# Patient Record
Sex: MALE | Race: WHITE | Employment: UNEMPLOYED | URBAN - METROPOLITAN AREA
[De-identification: names, ages, dates, MRNs, and addresses within clinical notes are randomized per-mention and may not be internally consistent; named-entity substitution may affect disease eponyms.]

---

## 2018-07-28 ENCOUNTER — HOSPITAL ENCOUNTER (INPATIENT)
Facility: HOSPITAL | Age: 58
LOS: 3 days | Discharge: HOME | DRG: 641 | End: 2018-07-31
Attending: EMERGENCY MEDICINE | Admitting: INTERNAL MEDICINE
Payer: MEDICARE

## 2018-07-28 DIAGNOSIS — R55 SYNCOPE, NEAR: Primary | ICD-10-CM

## 2018-07-28 DIAGNOSIS — E87.1 HYPONATREMIA: ICD-10-CM

## 2018-07-28 LAB
ALBUMIN SERPL BCP-MCNC: 4.4 G/DL (ref 3.5–4.8)
ALP SERPL-CCNC: 79 U/L (ref 32–100)
ALT SERPL-CCNC: 18 U/L (ref 17–63)
ANION GAP SERPL CALC-SCNC: 7 MMOL/L (ref 0–18)
ANION GAP SERPL CALC-SCNC: 9 MMOL/L (ref 0–18)
AST SERPL-CCNC: 17 U/L (ref 15–41)
BASOPHILS # BLD: 0 K/UL (ref 0–0.2)
BASOPHILS NFR BLD: 1 %
BILIRUB DIRECT SERPL-MCNC: 0.1 MG/DL (ref 0–0.2)
BILIRUB SERPL-MCNC: 0.7 MG/DL (ref 0.3–1.2)
BILIRUB UR QL: NEGATIVE
BUN SERPL-MCNC: 6 MG/DL (ref 8–20)
BUN SERPL-MCNC: 7 MG/DL (ref 8–20)
BUN/CREAT SERPL: 9.4 (ref 10–20)
BUN/CREAT SERPL: 9.5 (ref 10–20)
CALCIUM SERPL-MCNC: 9.2 MG/DL (ref 8.5–10.5)
CALCIUM SERPL-MCNC: 9.5 MG/DL (ref 8.5–10.5)
CHLORIDE SERPL-SCNC: 88 MMOL/L (ref 95–110)
CHLORIDE SERPL-SCNC: 95 MMOL/L (ref 95–110)
CLARITY UR: CLEAR
CO2 SERPL-SCNC: 26 MMOL/L (ref 22–32)
CO2 SERPL-SCNC: 28 MMOL/L (ref 22–32)
COLOR UR: COLORLESS
CREAT SERPL-MCNC: 0.64 MG/DL (ref 0.5–1.5)
CREAT SERPL-MCNC: 0.74 MG/DL (ref 0.5–1.5)
CREAT UR-MCNC: 15.3 MG/DL
EOSINOPHIL # BLD: 0.1 K/UL (ref 0–0.7)
EOSINOPHIL NFR BLD: 1 %
ERYTHROCYTE [DISTWIDTH] IN BLOOD BY AUTOMATED COUNT: 13.9 % (ref 11–15)
GLUCOSE BLDC GLUCOMTR-MCNC: 110 MG/DL (ref 70–99)
GLUCOSE BLDC GLUCOMTR-MCNC: 123 MG/DL (ref 70–99)
GLUCOSE BLDC GLUCOMTR-MCNC: 162 MG/DL (ref 70–99)
GLUCOSE SERPL-MCNC: 112 MG/DL (ref 70–99)
GLUCOSE SERPL-MCNC: 140 MG/DL (ref 70–99)
GLUCOSE UR-MCNC: NEGATIVE MG/DL
HBA1C MFR BLD: 5.7 % (ref 4–6)
HCT VFR BLD AUTO: 43.8 % (ref 41–52)
HGB BLD-MCNC: 15.4 G/DL (ref 13.5–17.5)
HGB UR QL STRIP.AUTO: NEGATIVE
KETONES UR-MCNC: NEGATIVE MG/DL
LEUKOCYTE ESTERASE UR QL STRIP.AUTO: NEGATIVE
LYMPHOCYTES # BLD: 1.1 K/UL (ref 1–4)
LYMPHOCYTES NFR BLD: 16 %
MCH RBC QN AUTO: 30.9 PG (ref 27–32)
MCHC RBC AUTO-ENTMCNC: 35.2 G/DL (ref 32–37)
MCV RBC AUTO: 87.8 FL (ref 80–100)
MONOCYTES # BLD: 0.5 K/UL (ref 0–1)
MONOCYTES NFR BLD: 8 %
NEUTROPHILS # BLD AUTO: 5.1 K/UL (ref 1.8–7.7)
NEUTROPHILS NFR BLD: 75 %
NITRITE UR QL STRIP.AUTO: NEGATIVE
OSMOLALITY UR CALC.SUM OF ELEC: 256 MOSM/KG (ref 275–295)
OSMOLALITY UR CALC.SUM OF ELEC: 268 MOSM/KG (ref 275–295)
OSMOLALITY UR: 137 MOSM/KG (ref 300–1100)
PH UR: 7 [PH] (ref 5–8)
PLATELET # BLD AUTO: 247 K/UL (ref 140–400)
PMV BLD AUTO: 7 FL (ref 7.4–10.3)
POTASSIUM SERPL-SCNC: 3.8 MMOL/L (ref 3.3–5.1)
POTASSIUM SERPL-SCNC: 4.4 MMOL/L (ref 3.3–5.1)
PROT SERPL-MCNC: 6.3 G/DL (ref 5.9–8.4)
PROT UR-MCNC: NEGATIVE MG/DL
RBC # BLD AUTO: 4.99 M/UL (ref 4.5–5.9)
SODIUM SERPL-SCNC: 123 MMOL/L (ref 136–144)
SODIUM SERPL-SCNC: 130 MMOL/L (ref 136–144)
SODIUM UR-SCNC: 38 MMOL/L
SP GR UR STRIP: 1 (ref 1–1.03)
UROBILINOGEN UR STRIP-ACNC: <2
VIT C UR-MCNC: NEGATIVE MG/DL
WBC # BLD AUTO: 6.7 K/UL (ref 4–11)

## 2018-07-28 PROCEDURE — 99223 1ST HOSP IP/OBS HIGH 75: CPT | Performed by: INTERNAL MEDICINE

## 2018-07-28 RX ORDER — AMITRIPTYLINE HYDROCHLORIDE 10 MG/1
10 TABLET, FILM COATED ORAL NIGHTLY
COMMUNITY

## 2018-07-28 RX ORDER — GABAPENTIN 400 MG/1
800 CAPSULE ORAL 3 TIMES DAILY
Status: DISCONTINUED | OUTPATIENT
Start: 2018-07-28 | End: 2018-07-29

## 2018-07-28 RX ORDER — MORPHINE SULFATE 30 MG/1
30 TABLET ORAL EVERY 12 HOURS PRN
COMMUNITY

## 2018-07-28 RX ORDER — TRAZODONE HYDROCHLORIDE 100 MG/1
100 TABLET ORAL NIGHTLY
Status: DISCONTINUED | OUTPATIENT
Start: 2018-07-28 | End: 2018-07-31

## 2018-07-28 RX ORDER — TRAZODONE HYDROCHLORIDE 100 MG/1
200 TABLET ORAL NIGHTLY
Status: DISCONTINUED | OUTPATIENT
Start: 2018-07-28 | End: 2018-07-28

## 2018-07-28 RX ORDER — HYDROCODONE BITARTRATE AND ACETAMINOPHEN 10; 325 MG/1; MG/1
1 TABLET ORAL EVERY 6 HOURS PRN
COMMUNITY

## 2018-07-28 RX ORDER — ASPIRIN 81 MG/1
81 TABLET ORAL DAILY
Status: DISCONTINUED | OUTPATIENT
Start: 2018-07-28 | End: 2018-07-31

## 2018-07-28 RX ORDER — HYDROCODONE BITARTRATE AND ACETAMINOPHEN 5; 325 MG/1; MG/1
1 TABLET ORAL ONCE
Status: COMPLETED | OUTPATIENT
Start: 2018-07-28 | End: 2018-07-28

## 2018-07-28 RX ORDER — PRAVASTATIN SODIUM 10 MG
10 TABLET ORAL DAILY
COMMUNITY

## 2018-07-28 RX ORDER — AMITRIPTYLINE HYDROCHLORIDE 10 MG/1
10 TABLET, FILM COATED ORAL NIGHTLY
Status: DISCONTINUED | OUTPATIENT
Start: 2018-07-28 | End: 2018-07-28

## 2018-07-28 RX ORDER — ASPIRIN 81 MG/1
81 TABLET ORAL DAILY
COMMUNITY

## 2018-07-28 RX ORDER — TRAZODONE HYDROCHLORIDE 100 MG/1
200 TABLET ORAL NIGHTLY
Status: ON HOLD | COMMUNITY
End: 2018-07-31

## 2018-07-28 RX ORDER — LISINOPRIL 20 MG/1
20 TABLET ORAL DAILY
COMMUNITY

## 2018-07-28 RX ORDER — HEPARIN SODIUM 5000 [USP'U]/ML
5000 INJECTION, SOLUTION INTRAVENOUS; SUBCUTANEOUS EVERY 12 HOURS SCHEDULED
Status: DISCONTINUED | OUTPATIENT
Start: 2018-07-28 | End: 2018-07-31

## 2018-07-28 RX ORDER — MORPHINE SULFATE 15 MG/1
30 TABLET ORAL EVERY 12 HOURS PRN
Status: DISCONTINUED | OUTPATIENT
Start: 2018-07-28 | End: 2018-07-31

## 2018-07-28 RX ORDER — PRAVASTATIN SODIUM 10 MG
10 TABLET ORAL DAILY
Status: DISCONTINUED | OUTPATIENT
Start: 2018-07-28 | End: 2018-07-31

## 2018-07-28 RX ORDER — BUPROPION HYDROCHLORIDE 100 MG/1
100 TABLET ORAL 2 TIMES DAILY
COMMUNITY

## 2018-07-28 RX ORDER — LISINOPRIL 20 MG/1
20 TABLET ORAL DAILY
Status: DISCONTINUED | OUTPATIENT
Start: 2018-07-29 | End: 2018-07-31

## 2018-07-28 RX ORDER — AMLODIPINE BESYLATE 5 MG/1
5 TABLET ORAL DAILY
Status: DISCONTINUED | OUTPATIENT
Start: 2018-07-28 | End: 2018-07-31

## 2018-07-28 RX ORDER — OXCARBAZEPINE 300 MG/1
1200 TABLET, FILM COATED ORAL 3 TIMES DAILY
Status: DISCONTINUED | OUTPATIENT
Start: 2018-07-28 | End: 2018-07-29

## 2018-07-28 RX ORDER — ACETAMINOPHEN 325 MG/1
650 TABLET ORAL EVERY 6 HOURS PRN
Status: DISCONTINUED | OUTPATIENT
Start: 2018-07-28 | End: 2018-07-31

## 2018-07-28 RX ORDER — AMLODIPINE BESYLATE 5 MG/1
5 TABLET ORAL DAILY
COMMUNITY

## 2018-07-28 RX ORDER — BUPROPION HYDROCHLORIDE 100 MG/1
100 TABLET, EXTENDED RELEASE ORAL 2 TIMES DAILY
Status: DISCONTINUED | OUTPATIENT
Start: 2018-07-31 | End: 2018-07-31

## 2018-07-28 RX ORDER — CLONAZEPAM 0.5 MG/1
0.5 TABLET ORAL 2 TIMES DAILY
COMMUNITY

## 2018-07-28 RX ORDER — CLONAZEPAM 0.5 MG/1
0.5 TABLET ORAL 2 TIMES DAILY
Status: DISCONTINUED | OUTPATIENT
Start: 2018-07-28 | End: 2018-07-31

## 2018-07-28 RX ORDER — GABAPENTIN 800 MG/1
800 TABLET ORAL 3 TIMES DAILY
COMMUNITY

## 2018-07-28 RX ORDER — OXCARBAZEPINE 600 MG/1
1200 TABLET, FILM COATED ORAL 3 TIMES DAILY
Status: ON HOLD | COMMUNITY
End: 2018-07-31

## 2018-07-28 NOTE — CONSULTS
[facility]    PATIENT'S NAME: Brendan Lopez   ATTENDING PHYSICIAN: Gil Benoit MD   CONSULTING PHYSICIAN: Israel Toro MD   PATIENT ACCOUNT#:   [id number]    LOCATION:  [address] [place] [address] #:   [id number]       DATE OF BIRTH: a seizure disorder. He has a history of trigeminal neuralgia which has not responded to conservative treatment. He had some type of brain surgery to help relieve this discomfort which was not successful. As a result, he is on chronic narcotics.   He also Distal pulses 2+. NEUROLOGIC:  Mental status intact. Alert and oriented x3. Neurologic exam otherwise nonfocal.    LABORATORY DATA:  Sodium 123, a blood sugar of 140 with a glycohemoglobin of 5.7.   His BUN and creatinine were normal at 7 and 0.74 respec

## 2018-07-28 NOTE — H&P
[facility]    History and Physical    Lydia Black Patient Status:  Inpatient    1960 MRN [id number]   Location [facility] 3W/SW Attending Susanne Bernal MD   [facility] Day # 0 PCP PHYSICIAN NONSTAFF     Date:  2018  Date Besylate 5 MG Oral Tab Take 5 mg by mouth daily. Pravastatin Sodium 10 MG Oral Tab Take 10 mg by mouth daily. OXcarbazepine 600 MG Oral Tab Take 1,200 mg by mouth 3 (three) times daily.    gabapentin 800 MG Oral Tab Take 800 mg by mouth 3 (three) times --------------------------       Assessment/Plan:     Near syncope , admit to tele    Hyponatremia, fluid restriction per renal, BMP  HTN BP stable  HLD on statin  Depression on meds  Seizure disorder on meds, neuro eval  D/W wife at bedside      **Certifi

## 2018-07-28 NOTE — ED NOTES
Care assumed from EMS presents from local hotel with witnessed sz On EMS arrival hypotensive/diaphoretic Presents to ED alert and interactive with C/O Baseline R sided chronic pain LEIGH Neuro intact No oral trauma + urinary incontinence Wife at bedside Last

## 2018-07-28 NOTE — ED PROVIDER NOTES
Patient Seen in: [facility] AND [facility] Emergency Department    History   Patient presents with:  Seizure Disorder (neurologic)    Stated Complaint: Witnessed Seizure    HPI    Patient is a 48-year-old male who was brought in after possible seizure.   Patient Vitals [07/28/18 0842]  BP: 155/75  Pulse: 71  Resp: 16  Temp: n/a  Temp src: Oral  SpO2: 97 %  O2 Device: None (Room air)    Current:BP (!) 175/87   Pulse 69   Resp 14   Ht 172.7 cm (5' 8\")   Wt 101.6 kg   SpO2 98%   BMI 34.06 kg/m²         Physical Exam Narrative: The following orders were created for panel order CBC WITH DIFFERENTIAL WITH PLATELET.   Procedure                               Abnormality         Status                     ---------                               -----------         ---

## 2018-07-28 NOTE — PLAN OF CARE
PT. ADMITTED FROM ER TO CV WITH NEAR SYNCOPE, SEIZURE, HYPONATREMIA, NEPHROLOGY CONSULT TODAY, FLUID RESTRICTION, NEUROLOGY CONSULT ORDERED

## 2018-07-29 LAB
ALBUMIN SERPL BCP-MCNC: 4.2 G/DL (ref 3.5–4.8)
ANION GAP SERPL CALC-SCNC: 8 MMOL/L (ref 0–18)
BASOPHILS # BLD: 0 K/UL (ref 0–0.2)
BASOPHILS NFR BLD: 1 %
BUN SERPL-MCNC: 8 MG/DL (ref 8–20)
BUN/CREAT SERPL: 11 (ref 10–20)
CALCIUM SERPL-MCNC: 9.3 MG/DL (ref 8.5–10.5)
CHLORIDE SERPL-SCNC: 96 MMOL/L (ref 95–110)
CO2 SERPL-SCNC: 26 MMOL/L (ref 22–32)
CORTIS SERPL-MCNC: 15.9 MCG/DL
CORTIS SERPL-MCNC: 28.6 MCG/DL
CREAT SERPL-MCNC: 0.73 MG/DL (ref 0.5–1.5)
EOSINOPHIL # BLD: 0.1 K/UL (ref 0–0.7)
EOSINOPHIL NFR BLD: 1 %
ERYTHROCYTE [DISTWIDTH] IN BLOOD BY AUTOMATED COUNT: 14.1 % (ref 11–15)
GLUCOSE BLDC GLUCOMTR-MCNC: 112 MG/DL (ref 70–99)
GLUCOSE BLDC GLUCOMTR-MCNC: 115 MG/DL (ref 70–99)
GLUCOSE BLDC GLUCOMTR-MCNC: 119 MG/DL (ref 70–99)
GLUCOSE BLDC GLUCOMTR-MCNC: 91 MG/DL (ref 70–99)
GLUCOSE SERPL-MCNC: 114 MG/DL (ref 70–99)
HCT VFR BLD AUTO: 45.3 % (ref 41–52)
HGB BLD-MCNC: 15.3 G/DL (ref 13.5–17.5)
LYMPHOCYTES # BLD: 1.5 K/UL (ref 1–4)
LYMPHOCYTES NFR BLD: 23 %
MAGNESIUM SERPL-MCNC: 1.9 MG/DL (ref 1.8–2.5)
MCH RBC QN AUTO: 30.7 PG (ref 27–32)
MCHC RBC AUTO-ENTMCNC: 33.9 G/DL (ref 32–37)
MCV RBC AUTO: 90.6 FL (ref 80–100)
MONOCYTES # BLD: 0.6 K/UL (ref 0–1)
MONOCYTES NFR BLD: 9 %
NEUTROPHILS # BLD AUTO: 4.3 K/UL (ref 1.8–7.7)
NEUTROPHILS NFR BLD: 66 %
OSMOLALITY UR CALC.SUM OF ELEC: 269 MOSM/KG (ref 275–295)
PHOSPHATE SERPL-MCNC: 3.9 MG/DL (ref 2.4–4.7)
PLATELET # BLD AUTO: 221 K/UL (ref 140–400)
PMV BLD AUTO: 6.9 FL (ref 7.4–10.3)
POTASSIUM SERPL-SCNC: 4.2 MMOL/L (ref 3.3–5.1)
RBC # BLD AUTO: 5 M/UL (ref 4.5–5.9)
SODIUM SERPL-SCNC: 130 MMOL/L (ref 136–144)
TSH SERPL-ACNC: 0.52 UIU/ML (ref 0.45–5.33)
WBC # BLD AUTO: 6.6 K/UL (ref 4–11)

## 2018-07-29 PROCEDURE — 99232 SBSQ HOSP IP/OBS MODERATE 35: CPT | Performed by: INTERNAL MEDICINE

## 2018-07-29 RX ORDER — GABAPENTIN 400 MG/1
800 CAPSULE ORAL
Status: DISCONTINUED | OUTPATIENT
Start: 2018-07-29 | End: 2018-07-31

## 2018-07-29 RX ORDER — OXCARBAZEPINE 300 MG/1
1200 TABLET, FILM COATED ORAL
Status: DISCONTINUED | OUTPATIENT
Start: 2018-07-29 | End: 2018-07-31

## 2018-07-29 NOTE — PROGRESS NOTES
07/29/18 0826   Clinical Encounter Type   Visited With Patient and family together   Routine Visit Introduction   Continue Visiting Yes   Referral From Other (Comment)   Referral To    Buddhism Encounters   Buddhism Needs Prayer   Spiritual Re

## 2018-07-29 NOTE — CONSULTS
[facility]    PATIENT'S NAME: Titus Yee   ATTENDING PHYSICIAN: Enriqueta Miranda MD   CONSULTING PHYSICIAN: Tarry Leyden.  Mayito Mcclendon MD   PATIENT ACCOUNT#:   [id number]    LOCATION:  [address] [place] [address] [address] #:   [id number]       DATE OF BIRTH:  [birth year] bruits, or goiters. LUNGS:  Clear. HEART:  Regular rate and rhythm. ABDOMEN:  Benign. EXTREMITIES:  Warm and dry without cyanosis, clubbing, edema, rashes, sores, joint pain, or swelling.     IMPRESSION:  I believe that the seizure-like episode was like

## 2018-07-29 NOTE — CONSULTS
07/28/18 2113 07/29/18 0522 07/29/18  0526 07/29/18 0818   BP: 143/77 154/86  144/82   Pulse: 83 79  74   Resp: 18 22 18   Temp: 98.5 °F (36.9 °C) 98.5 °F (36.9 °C)  98.8 °F (37.1 °C)   TempSrc: Oral Oral  Oral   SpO2: 94%   95%   Weight:   222 lb 6.4

## 2018-07-29 NOTE — PLAN OF CARE
Problem: Patient Centered Care  Goal: Patient preferences are identified and integrated in the patient's plan of care  Interventions:  - What would you like us to know as we care for you?  Pt is in town from [place], staying at a hotel with wife    - Tyrone Mcculloughradha Outcome: Progressing      Problem: METABOLIC/FLUID AND ELECTROLYTES - ADULT  Goal: Electrolytes maintained within normal limits  INTERVENTIONS:  - Monitor labs and rhythm and assess patient for signs and symptoms of electrolyte imbalances  - Administer e

## 2018-07-29 NOTE — PROGRESS NOTES
[facility]    Progress Note    Sanya Cruz Patient Status:  Inpatient    1960 MRN [id number]   Location [facility] 3W/SW Attending Marcos Lomeli MD   Hosp Day # 1 PCP PHYSICIAN NONSTAFF     Subjective:     Iamtio NORMAL LIMITS No previous ECGs available Electronically signed on 07/28/2018 at 17:29 by Fabio WHITE [facility] MD, Angela Woods MD  7/29/2018

## 2018-07-29 NOTE — PROGRESS NOTES
[facility]  Nephrology Daily Progress Note    Alexis Trevino  [id number]  60year old      HPI:   Alexis Trevino is a 60year old male. Feels well. No further neurologic symptoms.   Ambulating in room      ROS:     Constitutional:  Negative fo age    Labs:    Lab Results  Component Value Date   WBC 6.6 07/29/2018   HGB 15.3 07/29/2018   HCT 45.3 07/29/2018    07/29/2018   CREATSERUM 0.73 07/29/2018   BUN 8 07/29/2018    07/29/2018   K 4.2 07/29/2018   CL 96 07/29/2018   CO2 26 07/29 Nightly    Allergies:    Prednisone              OTHER (SEE COMMENTS)    Comment:agitation/confusion  Penicillins             RASH  Sulfa Antibiotics       RASH    Input/Output:    Intake/Output Summary (Last 24 hours) at 07/29/18 1224  Last data filed at

## 2018-07-30 LAB
ALBUMIN SERPL BCP-MCNC: 4.5 G/DL (ref 3.5–4.8)
ANION GAP SERPL CALC-SCNC: 8 MMOL/L (ref 0–18)
BUN SERPL-MCNC: 8 MG/DL (ref 8–20)
BUN/CREAT SERPL: 11.3 (ref 10–20)
CALCIUM SERPL-MCNC: 9.6 MG/DL (ref 8.5–10.5)
CHLORIDE SERPL-SCNC: 90 MMOL/L (ref 95–110)
CO2 SERPL-SCNC: 30 MMOL/L (ref 22–32)
CREAT SERPL-MCNC: 0.71 MG/DL (ref 0.5–1.5)
GLUCOSE BLDC GLUCOMTR-MCNC: 109 MG/DL (ref 70–99)
GLUCOSE BLDC GLUCOMTR-MCNC: 123 MG/DL (ref 70–99)
GLUCOSE BLDC GLUCOMTR-MCNC: 84 MG/DL (ref 70–99)
GLUCOSE SERPL-MCNC: 103 MG/DL (ref 70–99)
OSMOLALITY UR CALC.SUM OF ELEC: 265 MOSM/KG (ref 275–295)
PHOSPHATE SERPL-MCNC: 3.8 MG/DL (ref 2.4–4.7)
POTASSIUM SERPL-SCNC: 4.7 MMOL/L (ref 3.3–5.1)
SODIUM SERPL-SCNC: 128 MMOL/L (ref 136–144)

## 2018-07-30 PROCEDURE — 99233 SBSQ HOSP IP/OBS HIGH 50: CPT | Performed by: INTERNAL MEDICINE

## 2018-07-30 RX ORDER — HYDROCODONE BITARTRATE AND ACETAMINOPHEN 10; 325 MG/1; MG/1
1 TABLET ORAL EVERY 8 HOURS PRN
Status: DISCONTINUED | OUTPATIENT
Start: 2018-07-30 | End: 2018-07-31

## 2018-07-30 NOTE — PLAN OF CARE
Problem: Patient Centered Care  Goal: Patient preferences are identified and integrated in the patient's plan of care  Interventions:  - What would you like us to know as we care for you?  Pt is in town from [place], staying at a hotel with wife    - Elver Sykes Outcome: Progressing      Problem: METABOLIC/FLUID AND ELECTROLYTES - ADULT  Goal: Electrolytes maintained within normal limits  INTERVENTIONS:  - Monitor labs and rhythm and assess patient for signs and symptoms of electrolyte imbalances  - Administer e

## 2018-07-30 NOTE — PROGRESS NOTES
[facility]    Progress Note    Mony Kinney Patient Status:  Inpatient    1960 MRN [id number]   Location [facility] 3W/SW Attending Virginia Blackmon MD   [facility] Day # 2 PCP PHYSICIAN NONSTAFF     Subjective:     Constitutio 0.52 07/29/2018   MG 1.9 07/29/2018   PHOS 3.8 07/30/2018                         Charles Patel MD, Marcelina Macias MD  7/30/2018

## 2018-07-31 VITALS
RESPIRATION RATE: 16 BRPM | HEIGHT: 68 IN | WEIGHT: 228.13 LBS | HEART RATE: 72 BPM | DIASTOLIC BLOOD PRESSURE: 95 MMHG | OXYGEN SATURATION: 92 % | TEMPERATURE: 98 F | SYSTOLIC BLOOD PRESSURE: 168 MMHG | BODY MASS INDEX: 34.58 KG/M2

## 2018-07-31 LAB
ALBUMIN SERPL BCP-MCNC: 3.9 G/DL (ref 3.5–4.8)
ANION GAP SERPL CALC-SCNC: 9 MMOL/L (ref 0–18)
BUN SERPL-MCNC: 9 MG/DL (ref 8–20)
BUN/CREAT SERPL: 11.5 (ref 10–20)
CALCIUM SERPL-MCNC: 9.2 MG/DL (ref 8.5–10.5)
CHLORIDE SERPL-SCNC: 90 MMOL/L (ref 95–110)
CO2 SERPL-SCNC: 29 MMOL/L (ref 22–32)
CREAT SERPL-MCNC: 0.78 MG/DL (ref 0.5–1.5)
GLUCOSE BLDC GLUCOMTR-MCNC: 107 MG/DL (ref 70–99)
GLUCOSE BLDC GLUCOMTR-MCNC: 124 MG/DL (ref 70–99)
GLUCOSE SERPL-MCNC: 120 MG/DL (ref 70–99)
MAGNESIUM SERPL-MCNC: 1.8 MG/DL (ref 1.8–2.5)
OSMOLALITY UR CALC.SUM OF ELEC: 266 MOSM/KG (ref 275–295)
PHOSPHATE SERPL-MCNC: 4.3 MG/DL (ref 2.4–4.7)
POTASSIUM SERPL-SCNC: 4.4 MMOL/L (ref 3.3–5.1)
SODIUM SERPL-SCNC: 128 MMOL/L (ref 136–144)

## 2018-07-31 PROCEDURE — 99233 SBSQ HOSP IP/OBS HIGH 50: CPT | Performed by: INTERNAL MEDICINE

## 2018-07-31 RX ORDER — TRAZODONE HYDROCHLORIDE 50 MG/1
50 TABLET ORAL NIGHTLY
Status: DISCONTINUED | OUTPATIENT
Start: 2018-07-31 | End: 2018-07-31

## 2018-07-31 RX ORDER — PRAVASTATIN SODIUM 10 MG
10 TABLET ORAL NIGHTLY
Status: DISCONTINUED | OUTPATIENT
Start: 2018-07-31 | End: 2018-07-31

## 2018-07-31 RX ORDER — TRAZODONE HYDROCHLORIDE 50 MG/1
50 TABLET ORAL NIGHTLY
Qty: 30 TABLET | Refills: 0 | Status: SHIPPED | OUTPATIENT
Start: 2018-07-31

## 2018-07-31 NOTE — PROGRESS NOTES
[facility]    Progress Note    Mony Kinney Patient Status:  Inpatient    1960 MRN [id number]   Location [facility] 3W/SW Attending Virginia Blackmon MD   [address] Day # 3 PCP PHYSICIAN NONSTAFF     Subjective:     Constitutio

## 2018-07-31 NOTE — PROGRESS NOTES
[facility]  Nephrology Daily Progress Note    Lee Warner  [id number]  60year old      HPI:   Lee Warner is a 60year old male. As above. Continuous EEG still in progress. No further  \"sz\" like activity. Otherwise no complaints. clubbing  Neurological: exam appropriate for age reflexes and motor skills appropriate for age    Labs:    Lab Results  Component Value Date   CREATSERUM 0.78 07/31/2018   BUN 9 07/31/2018    07/31/2018   K 4.4 07/31/2018   CL 90 07/31/2018   CO2 29 Nightly  •  Sertraline HCl (ZOLOFT) tab 50 mg, 50 mg, Oral, Nightly  •  acetaminophen (TYLENOL) tab 650 mg, 650 mg, Oral, Q6H PRN  •  aspirin EC tab 81 mg, 81 mg, Oral, Daily  •  morphINE Sulfate IR (MSIR) tab 30 mg, 30 mg, Oral, Q12H PRN  •  Heparin Sodiu

## 2018-07-31 NOTE — PROGRESS NOTES
07/30/18  0552 07/30/18  0732 07/30/18  1343 07/30/18  2100   BP:  (!) 140/93 158/77 140/81   Pulse:    73   Resp:  18 16 18   Temp:  98.2 °F (36.8 °C) 98.6 °F (37 °C) 98.5 °F (36.9 °C)   TempSrc:  Oral Oral Oral   SpO2:  94% 93% 94%   Weight: 227 lb 4.8

## 2018-07-31 NOTE — DISCHARGE SUMMARY
[facility]    Discharge Summary    Tamar Prajapati Patient Status:  Inpatient    1960 MRN [id number]   Location [facility] 3W/SW Attending Katie Kate MD   [facility] Day # 3 PCP PHYSICIAN NONSTAFF     Date of Admission:  mg by mouth 3 (three) times daily. BuPROPion HCl 100 MG Oral Tab  Take 100 mg by mouth 2 (two) times daily. aspirin 81 MG Oral Tab EC  Take 81 mg by mouth daily.               Discharge Diet: Fluid restriction 1000 ml    Discharge Activity: As melissa

## 2018-07-31 NOTE — BH PROGRESS NOTE
Behavioral Health Note  CHIEF COMPLAINT  Seizure Disorder    REASON FOR ADMISSION  Seizure Disorder    SOCIAL HISTORY  The patient lives with his wife in [facility] and is disabled. The patient smokes a half a pack per day.    PAST PSYCHIATRIC HISTORY  The volume  Mood:depressed, irritable  Affect: dysphoric  Conscious/Orientation: alert and oriented, good attention and memory  Thought process: linear  Thought content:  No abnormality  Perceptions: no hallucinations  Insight:limited insight into his depressi

## 2018-07-31 NOTE — PLAN OF CARE
Problem: Patient Centered Care  Goal: Patient preferences are identified and integrated in the patient's plan of care  Interventions:  - What would you like us to know as we care for you?  Pt is in town from [place], staying at a hotel with wife    - Fredrick Singh Outcome: Progressing      Problem: METABOLIC/FLUID AND ELECTROLYTES - ADULT  Goal: Electrolytes maintained within normal limits  INTERVENTIONS:  - Monitor labs and rhythm and assess patient for signs and symptoms of electrolyte imbalances  - Administer e

## 2018-07-31 NOTE — PROGRESS NOTES
[facility]    Progress Note    Elodia Ryan Patient Status:  Inpatient    1960 MRN [id number]   Location [facility] 3W/SW Attending Sudhakar Ibarra MD   [facility] Day # 2 PCP PHYSICIAN NONSTAFF       Subjective:   Elodia Ryan abnormal bruising noted  Back/Spine: no abnormalities noted  Musculoskeletal: full ROM all extremities good strength  no deformities  Extremities: no edema, cyanosis  Neurological:  Better. No cloiic moveements  Alert.  Turban on head    Results:     Garcia Tolu

## 2018-08-01 NOTE — PLAN OF CARE
Discharge instructions and rx given, fluid restriction instructed, no further questions, wife here and taking pt.